# Patient Record
Sex: MALE | Race: WHITE
[De-identification: names, ages, dates, MRNs, and addresses within clinical notes are randomized per-mention and may not be internally consistent; named-entity substitution may affect disease eponyms.]

---

## 2019-09-06 ENCOUNTER — HOSPITAL ENCOUNTER (EMERGENCY)
Dept: HOSPITAL 25 - ED | Age: 44
Discharge: HOME | End: 2019-09-06
Payer: SELF-PAY

## 2019-09-06 VITALS — DIASTOLIC BLOOD PRESSURE: 72 MMHG | SYSTOLIC BLOOD PRESSURE: 117 MMHG
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
## 2019-09-06 DIAGNOSIS — T78.1XXA: Primary | ICD-10-CM

## 2019-09-06 PROCEDURE — 99282 EMERGENCY DEPT VISIT SF MDM: CPT

## 2019-09-06 NOTE — ED
Allergic Reaction/Systemic





- HPI Summary


HPI Summary: 


Pt. is a 44 y.o male who presents to the ER for allergic reaction.  Patient 

works in the kitchen at [employer] and states today he was eating 

oatmeal when a few hours later he" abdominal pain and vomited. Pt. states after 

that his coworker noticed he had mild swelling to his upper lip and a little 

redness on his face. Pt. noted mild rash and itching to bilateral arms. Pt. 

states since his symptoms have almost resolved. He denies mouth swelling, 

difficulty swallowing, SOB. Pt. states he has an allergy to walnuts and notes 

he was working with chicken salad today that does have walnuts in it but pt. 

states he was wearing gloves. Pt. states he has never had an anaphylactic rxn 

and has never used an epipen. Sxs are mild in severity. No modifying factors. 








- History of Current Complaint


Chief Complaint: EDAllergicReaction


Time Seen by Provider: 09/06/19 14:56


Hx Obtained From: Patient


Pain Intensity: 0





- Allergies/Home Medications


Allergies/Adverse Reactions: 


 Allergies











Allergy/AdvReac Type Severity Reaction Status Date / Time


 


walnut Allergy  Swelling Verified 09/06/19 13:51














PMH/Surg Hx/FS Hx/Imm Hx


Previously Healthy: Yes


Infectious Disease History: No


Infectious Disease History: 


   Denies: Traveled Outside the [place] in Last 30 Days





- Family History


Known Family History: Positive: Non-Contributory





- Social History


Occupation: Employed Full-time


Lives: With Family





Review of Systems


Constitutional: Negative


ENT: Negative


Cardiovascular: Negative


Respiratory: Negative


Positive: Rash


All Other Systems Reviewed And Are Negative: Yes





Physical Exam


Triage Information Reviewed: Yes


Vital Signs On Initial Exam: 


 Initial Vitals











Temp Pulse Resp BP Pulse Ox


 


 99 F   95   16   158/78   96 


 


 09/06/19 13:51  09/06/19 13:51  09/06/19 13:51  09/06/19 13:51  09/06/19 13:51











Vital Signs Reviewed: Yes


Appearance: Positive: Well-Appearing - Pt. sitting on bed in NAD. Talkative and 

pleasant.


Skin: Positive: Warm, Dry, Other - faint erythema over bilateral AC regions. No 

urticaria.


Eyes: Positive: Normal, EOMI


ENT: Positive: Pharynx normal, Other - No face, lips or tongue edema..  Negative

: Trismus, Muffled voice


Neck: Positive: Supple


Respiratory/Lung Sounds: Positive: Clear to Auscultation, Breath Sounds 

Present.  Negative: Wheezes


Cardiovascular: Positive: Normal, RRR


Musculoskeletal: Positive: Normal, Strength/ROM Intact


Neurological: Positive: Normal, CN Intact II-III


Psychiatric: Positive: Affect/Mood Appropriate





Diagnostics





- Vital Signs


 Vital Signs











  Temp Pulse Resp BP Pulse Ox


 


 09/06/19 13:51  99 F  95  16  158/78  96














- Laboratory


Lab Statement: Any lab studies that have been ordered have been reviewed, and 

results considered in the medical decision making process.





Allergic Reaction Course/Dx





- Course


Course Of Treatment: Patient presenting with possible mild allergic reaction to 

oatmeal or walnuts?  Patient's symptoms have almost all resolved since being in 

the ER.  He has no evidence of anaphylactic reaction.  Discussed treatment with 

Benadryl but patient declines because he does not want to be drowsy.  We'll 

give a dose of Claritin.  Patient follow-up with the [facility] if 

needed.  Return to the ER immediately if symptoms return or worsen.  Patient 

understands and agrees with plan.





- Diagnoses


Differential Diagnosis/HQI/PQRI: Positive: Anaphylaxis, Angioedema, Bronchospasm

, Local Allergic Reaction


Provider Diagnoses: 


 Food allergy








Discharge ED





- Sign-Out/Discharge


Documenting (check all that apply): Patient Departure


Patient Received Moderate/Deep Sedation with Procedure: No





- Discharge Plan


Condition: Improved


Disposition: HOME


Patient Education Materials:  Food Allergy (ED)


Referrals: 


[facility] [Outside]


Additional Instructions: 


Follow up with the [facility] 


Take benadryl as directed if symptoms persist


Return to ER if symptoms change or worsen





- Billing Disposition and Condition


Condition: IMPROVED


Disposition: Home